# Patient Record
Sex: MALE | Race: WHITE
[De-identification: names, ages, dates, MRNs, and addresses within clinical notes are randomized per-mention and may not be internally consistent; named-entity substitution may affect disease eponyms.]

---

## 2017-07-17 ENCOUNTER — HOSPITAL ENCOUNTER (EMERGENCY)
Dept: HOSPITAL 83 - ED | Age: 27
Discharge: HOME | End: 2017-07-17
Payer: SELF-PAY

## 2017-07-17 DIAGNOSIS — S61.211A: Primary | ICD-10-CM

## 2017-07-17 DIAGNOSIS — F17.200: ICD-10-CM
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
## 2017-07-17 DIAGNOSIS — Z23: ICD-10-CM
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
## 2018-05-26 ENCOUNTER — HOSPITAL ENCOUNTER (EMERGENCY)
Dept: HOSPITAL 83 - ED | Age: 28
Discharge: HOME | End: 2018-05-26
Payer: SELF-PAY

## 2018-05-26 VITALS — BODY MASS INDEX: 36.1 KG/M2 | HEIGHT: 66.97 IN | WEIGHT: 230 LBS

## 2018-05-26 DIAGNOSIS — M67.431: Primary | ICD-10-CM

## 2018-05-26 DIAGNOSIS — Z98.890: ICD-10-CM

## 2018-05-26 DIAGNOSIS — R03.0: ICD-10-CM

## 2018-10-30 ENCOUNTER — HOSPITAL ENCOUNTER (EMERGENCY)
Dept: HOSPITAL 83 - ED | Age: 28
Discharge: HOME | End: 2018-10-30
Payer: COMMERCIAL

## 2018-10-30 VITALS — WEIGHT: 216 LBS | BODY MASS INDEX: 33.9 KG/M2 | HEIGHT: 66.97 IN

## 2018-10-30 DIAGNOSIS — J03.90: ICD-10-CM

## 2018-10-30 DIAGNOSIS — H65.01: ICD-10-CM

## 2018-10-30 DIAGNOSIS — J02.9: Primary | ICD-10-CM

## 2018-12-28 ENCOUNTER — HOSPITAL ENCOUNTER (EMERGENCY)
Dept: HOSPITAL 83 - ED | Age: 28
Discharge: HOME | End: 2018-12-28
Payer: COMMERCIAL

## 2018-12-28 VITALS — WEIGHT: 216 LBS | HEIGHT: 66.97 IN | BODY MASS INDEX: 33.9 KG/M2

## 2018-12-28 DIAGNOSIS — L72.0: Primary | ICD-10-CM

## 2019-04-06 ENCOUNTER — HOSPITAL ENCOUNTER (EMERGENCY)
Dept: HOSPITAL 83 - ED | Age: 29
Discharge: HOME | End: 2019-04-06
Payer: COMMERCIAL

## 2019-04-06 VITALS — WEIGHT: 220 LBS | BODY MASS INDEX: 34.53 KG/M2 | HEIGHT: 66.97 IN
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
## 2019-04-06 DIAGNOSIS — S63.502A: Primary | ICD-10-CM

## 2020-01-13 ENCOUNTER — HOSPITAL ENCOUNTER (EMERGENCY)
Dept: HOSPITAL 83 - ED | Age: 30
Discharge: HOME | End: 2020-01-13
Payer: SELF-PAY

## 2020-01-13 VITALS — HEIGHT: 66.97 IN | WEIGHT: 230 LBS | BODY MASS INDEX: 36.1 KG/M2

## 2020-01-13 DIAGNOSIS — J02.8: Primary | ICD-10-CM

## 2020-01-13 DIAGNOSIS — Z79.899: ICD-10-CM

## 2023-08-17 ENCOUNTER — HOSPITAL ENCOUNTER (EMERGENCY)
Dept: HOSPITAL 83 - ED | Age: 33
Discharge: HOME | End: 2023-08-17
Payer: MEDICAID

## 2023-08-17 VITALS — HEIGHT: 66.97 IN | WEIGHT: 270 LBS | BODY MASS INDEX: 42.38 KG/M2
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
## 2023-08-17 DIAGNOSIS — S60.312A: Primary | ICD-10-CM
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
## 2023-08-17 DIAGNOSIS — Z98.890: ICD-10-CM

## 2023-09-01 ENCOUNTER — HOSPITAL ENCOUNTER (EMERGENCY)
Dept: HOSPITAL 83 - ED | Age: 33
Discharge: HOME | End: 2023-09-01
Payer: MEDICAID

## 2023-09-01 VITALS — HEIGHT: 66.97 IN | BODY MASS INDEX: 41.59 KG/M2 | WEIGHT: 265 LBS

## 2023-09-01 DIAGNOSIS — Q27.30: Primary | ICD-10-CM

## 2023-09-01 DIAGNOSIS — Z98.890: ICD-10-CM

## 2023-09-28 ENCOUNTER — HOSPITAL ENCOUNTER (EMERGENCY)
Dept: HOSPITAL 83 - ED | Age: 33
Discharge: HOME | End: 2023-09-28
Payer: MEDICAID

## 2023-09-28 VITALS — BODY MASS INDEX: 41.59 KG/M2 | WEIGHT: 265 LBS | HEIGHT: 66.97 IN

## 2023-09-28 DIAGNOSIS — R58: ICD-10-CM

## 2023-09-28 DIAGNOSIS — Z98.890: ICD-10-CM

## 2023-09-28 DIAGNOSIS — R22.31: Primary | ICD-10-CM

## 2023-10-03 ENCOUNTER — ANESTHESIA EVENT (OUTPATIENT)
Dept: OPERATING ROOM | Age: 33
End: 2023-10-03
Payer: MEDICAID

## 2023-10-03 ASSESSMENT — LIFESTYLE VARIABLES: SMOKING_STATUS: 0

## 2023-10-03 NOTE — H&P
History and Physical    Patient's Name/Date of Birth: Guillermo Bush / 1990 (89 y.o.)    Date: October 2, 2023     Chief Complaint: right thumb bleeding mass    HPI:    This is a 33years of age, right-hand-dominant, white male, who has had a history of what he calls a blood blister 2+ months ago. He has poked it a couple different times at home and had fair amount of hemorrhage. He did have to hold pressure, and did end up going to the emergency room on at least 1 occasion. Emergency room physician told him that there was an artery on the end of his finger that was causing the brisk bleeding. He was asked to follow-up with a surgeon, preferably a hand surgeon. The patient did not have insurance, and ended up getting insurance and finding my name on the Internet. I did provide the initial evaluation on Jun, at our [facility] office on 10/02/2023. I have counseled the patient on different treatment options of the pyogenic granuloma either chemically with silver nitrate/potassium nitrate sticks, which in his case since this was such a large mass might take 2 to 3 weeks, for necrosis of the stalk of the mass. The patient really would like surgery done because of the growth test nature of the mass off the tip of the thumb. I am in agreement and we did have a cancellation so we could do it 2 days later. It is inconvenient from [facility] up to [facility], although he is compliant. We did obtain verbal/written informed consent, while the patient was in the office. All questions were answered in the office, and again answered in the preoperative holding room. History reviewed. No pertinent past medical history. Past Surgical History:   Procedure Laterality Date    MYRINGOTOMY W/ TUBES         Prior to Admission medications    Not on File       Patient has no known allergies. History reviewed. No pertinent family history.     Social History     Socioeconomic History    Marital status: Not on file     Spouse name: Not on file    Number of children: Not on file    Years of education: Not on file    Highest education level: Not on file   Occupational History    Not on file   Tobacco Use    Smoking status: Former     Types: Cigarettes    Smokeless tobacco: Former   Vaping Use    Vaping Use: Every day    Substances: Nicotine, Flavoring   Substance and Sexual Activity    Alcohol use: Yes     Comment: OCCASIONAL    Drug use: Not on file    Sexual activity: Not on file   Other Topics Concern    Not on file   Social History Narrative    Not on file     Social Determinants of Health     Financial Resource Strain: Not on file   Food Insecurity: Not on file   Transportation Needs: Not on file   Physical Activity: Not on file   Stress: Not on file   Social Connections: Not on file   Intimate Partner Violence: Not on file   Housing Stability: Not on file       Review of Systems:   CONSTITUTIONAL:  negative  EYES:  negative  HEENT:  negative  RESPIRATORY:  negative  CARDIOVASCULAR:  negative  GASTROINTESTINAL:  negative  GENITOURINARY:  negative  INTEGUMENT/BREAST:  negative  HEMATOLOGIC/LYMPHATIC:  negative  ALLERGIC/IMMUNOLOGIC:  negative  ENDOCRINE:  negative  MUSCULOSKELETAL:  negative  NEUROLOGICAL:  negative  BEHAVIOR/PSYCH:  negative    Physical Exam:  Vitals:    10/02/23 1513   Weight: 270 lb (122.5 kg)   Height: 5' 7\" (1.702 m)       CONSTITUTIONAL:  awake, alert, cooperative, no apparent distress, and appears stated age  EYES:  Lids and lashes normal, pupils equal, round and reactive to light, extra ocular muscles intact, sclera clear, conjunctiva normal  ENT:  Normocephalic, without obvious abnormality, atraumatic, sinuses nontender on palpation, external ears without lesions, oral pharynx with moist mucus membranes, tonsils without erythema or exudates, gums normal and good dentition.   NECK:  Supple, symmetrical, trachea midline, no adenopathy, thyroid symmetric, not

## 2023-10-04 ENCOUNTER — HOSPITAL ENCOUNTER (OUTPATIENT)
Age: 33
Setting detail: OUTPATIENT SURGERY
Discharge: HOME | End: 2023-10-04
Attending: SURGERY | Admitting: SURGERY
Payer: MEDICAID

## 2023-10-04 ENCOUNTER — ANESTHESIA (OUTPATIENT)
Dept: OPERATING ROOM | Age: 33
End: 2023-10-04
Payer: MEDICAID

## 2023-10-04 VITALS
BODY MASS INDEX: 42.25 KG/M2 | RESPIRATION RATE: 16 BRPM | HEIGHT: 67 IN | DIASTOLIC BLOOD PRESSURE: 64 MMHG | HEART RATE: 87 BPM | TEMPERATURE: 97.9 F | SYSTOLIC BLOOD PRESSURE: 117 MMHG | WEIGHT: 269.2 LBS | OXYGEN SATURATION: 96 %

## 2023-10-04 DIAGNOSIS — L98.0 PYOGENIC GRANULOMA: ICD-10-CM

## 2023-10-04 PROCEDURE — 2500000003 HC RX 250 WO HCPCS: Performed by: NURSE ANESTHETIST, CERTIFIED REGISTERED

## 2023-10-04 PROCEDURE — 2500000003 HC RX 250 WO HCPCS: Performed by: SURGERY

## 2023-10-04 PROCEDURE — 7100000011 HC PHASE II RECOVERY - ADDTL 15 MIN: Performed by: SURGERY

## 2023-10-04 PROCEDURE — 6360000002 HC RX W HCPCS: Performed by: NURSE ANESTHETIST, CERTIFIED REGISTERED

## 2023-10-04 PROCEDURE — 2580000003 HC RX 258: Performed by: ANESTHESIOLOGY

## 2023-10-04 PROCEDURE — 88305 TISSUE EXAM BY PATHOLOGIST: CPT

## 2023-10-04 PROCEDURE — 2709999900 HC NON-CHARGEABLE SUPPLY: Performed by: SURGERY

## 2023-10-04 PROCEDURE — 3600000013 HC SURGERY LEVEL 3 ADDTL 15MIN: Performed by: SURGERY

## 2023-10-04 PROCEDURE — 7100000010 HC PHASE II RECOVERY - FIRST 15 MIN: Performed by: SURGERY

## 2023-10-04 PROCEDURE — 6370000000 HC RX 637 (ALT 250 FOR IP): Performed by: NURSE ANESTHETIST, CERTIFIED REGISTERED

## 2023-10-04 PROCEDURE — 3600000003 HC SURGERY LEVEL 3 BASE: Performed by: SURGERY

## 2023-10-04 PROCEDURE — 3700000000 HC ANESTHESIA ATTENDED CARE: Performed by: SURGERY

## 2023-10-04 PROCEDURE — 3700000001 HC ADD 15 MINUTES (ANESTHESIA): Performed by: SURGERY

## 2023-10-04 RX ORDER — FENTANYL CITRATE 50 UG/ML
INJECTION, SOLUTION INTRAMUSCULAR; INTRAVENOUS PRN
Status: DISCONTINUED | OUTPATIENT
Start: 2023-10-04 | End: 2023-10-04 | Stop reason: SDUPTHER

## 2023-10-04 RX ORDER — PROPOFOL 10 MG/ML
INJECTION, EMULSION INTRAVENOUS CONTINUOUS PRN
Status: DISCONTINUED | OUTPATIENT
Start: 2023-10-04 | End: 2023-10-04 | Stop reason: SDUPTHER

## 2023-10-04 RX ORDER — LIDOCAINE HYDROCHLORIDE 20 MG/ML
INJECTION, SOLUTION INTRAVENOUS PRN
Status: DISCONTINUED | OUTPATIENT
Start: 2023-10-04 | End: 2023-10-04 | Stop reason: SDUPTHER

## 2023-10-04 RX ORDER — LABETALOL HYDROCHLORIDE 5 MG/ML
INJECTION, SOLUTION INTRAVENOUS PRN
Status: DISCONTINUED | OUTPATIENT
Start: 2023-10-04 | End: 2023-10-04 | Stop reason: SDUPTHER

## 2023-10-04 RX ORDER — MIDAZOLAM HYDROCHLORIDE 1 MG/ML
INJECTION INTRAMUSCULAR; INTRAVENOUS PRN
Status: DISCONTINUED | OUTPATIENT
Start: 2023-10-04 | End: 2023-10-04 | Stop reason: SDUPTHER

## 2023-10-04 RX ORDER — KETAMINE HCL IN NACL, ISO-OSM 100MG/10ML
SYRINGE (ML) INJECTION PRN
Status: DISCONTINUED | OUTPATIENT
Start: 2023-10-04 | End: 2023-10-04 | Stop reason: SDUPTHER

## 2023-10-04 RX ORDER — HYDROCODONE BITARTRATE AND ACETAMINOPHEN 5; 325 MG/1; MG/1
1 TABLET ORAL EVERY 4 HOURS PRN
Qty: 4 TABLET | Refills: 0 | Status: SHIPPED | OUTPATIENT
Start: 2023-10-04 | End: 2023-10-06

## 2023-10-04 RX ORDER — ALBUTEROL SULFATE 90 UG/1
AEROSOL, METERED RESPIRATORY (INHALATION) PRN
Status: DISCONTINUED | OUTPATIENT
Start: 2023-10-04 | End: 2023-10-04 | Stop reason: SDUPTHER

## 2023-10-04 RX ORDER — SODIUM CHLORIDE, SODIUM LACTATE, POTASSIUM CHLORIDE, CALCIUM CHLORIDE 600; 310; 30; 20 MG/100ML; MG/100ML; MG/100ML; MG/100ML
INJECTION, SOLUTION INTRAVENOUS CONTINUOUS
Status: DISCONTINUED | OUTPATIENT
Start: 2023-10-04 | End: 2023-10-04 | Stop reason: HOSPADM

## 2023-10-04 RX ADMIN — LIDOCAINE HYDROCHLORIDE 50 MG: 20 INJECTION, SOLUTION INTRAVENOUS at 07:50

## 2023-10-04 RX ADMIN — FENTANYL CITRATE 50 MCG: 50 INJECTION INTRAMUSCULAR; INTRAVENOUS at 08:10

## 2023-10-04 RX ADMIN — MIDAZOLAM 2 MG: 1 INJECTION INTRAMUSCULAR; INTRAVENOUS at 07:48

## 2023-10-04 RX ADMIN — Medication 30 MG: at 07:50

## 2023-10-04 RX ADMIN — LABETALOL HYDROCHLORIDE 10 MG: 5 INJECTION INTRAVENOUS at 07:55

## 2023-10-04 RX ADMIN — SODIUM CHLORIDE, POTASSIUM CHLORIDE, SODIUM LACTATE AND CALCIUM CHLORIDE: 600; 310; 30; 20 INJECTION, SOLUTION INTRAVENOUS at 07:22

## 2023-10-04 RX ADMIN — PROPOFOL 100 MCG/KG/MIN: 10 INJECTION, EMULSION INTRAVENOUS at 07:50

## 2023-10-04 RX ADMIN — FENTANYL CITRATE 50 MCG: 50 INJECTION INTRAMUSCULAR; INTRAVENOUS at 07:50

## 2023-10-04 RX ADMIN — LABETALOL HYDROCHLORIDE 10 MG: 5 INJECTION INTRAVENOUS at 08:06

## 2023-10-04 RX ADMIN — ALBUTEROL SULFATE 4 PUFF: 90 AEROSOL, METERED RESPIRATORY (INHALATION) at 08:05

## 2023-10-04 ASSESSMENT — PAIN DESCRIPTION - DESCRIPTORS: DESCRIPTORS: SHARP

## 2023-10-04 ASSESSMENT — PAIN SCALES - GENERAL
PAINLEVEL_OUTOF10: 0

## 2023-10-04 ASSESSMENT — PAIN - FUNCTIONAL ASSESSMENT: PAIN_FUNCTIONAL_ASSESSMENT: 0-10

## 2023-10-04 NOTE — ANESTHESIA POSTPROCEDURE EVALUATION
Department of Anesthesiology  Postprocedure Note    Patient: Barney River  MRN: [id number]  YOB: 1990  Date of evaluation: 10/4/2023      Procedure Summary     Date: 10/04/23 Room / Location: [address] [place] [address] 01 / [id number] Julianne Laura    Anesthesia Start: 2420 Anesthesia Stop:     Procedure: RIGHT THUMB P2 MASS EXCISION (Right) Diagnosis:       Pyogenic granuloma      (Pyogenic granuloma [L98.0])    Surgeons: Austin Boudreaux MD Responsible Provider: Merlin Nutting, MD    Anesthesia Type: MAC ASA Status: 2          Anesthesia Type: No value filed.     Kathy Phase I: Kathy Score: 10    Kathy Phase II:        Anesthesia Post Evaluation

## 2023-10-04 NOTE — OP NOTE
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]    Post-Op Diagnosis: Same       Procedure(s):  RIGHT THUMB- P2 MASS EXCISION    Surgeon(s):  Peña William M.D.; Hand + Reconstructive Surgery    Assistant:   * No surgical staff found *    Anesthesia: Monitor Anesthesia Care + Local    Estimated Blood Loss (mL): Minimal 3 ml    Complications: None    Specimens:   ID Type Source Tests Collected by Time Destination   A : right thumb P2 mass  Tissue Tissue SURGICAL PATHOLOGY Peña William MD 10/4/2023 0809        Implants:  * No implants in log *      Drains: * No LDAs found *    This procedure was not performed to treat primary cutaneous melanoma through wide local excision    OPERATIVE NOTE : Right thumb mass excision + excisional biopsy    Indications: This is a 33years of age, right-hand-dominant, white male, has had a 2+ month history of progressive growth of a highly friable, hemorrhagic mass. By history, and physical examination, this is highly consistent with a pyogenic granuloma. I have counseled the patient on different treatment options, including non-surgical ones. Since the mass was 3 x 1.5 x 1.5 cm, and fairly grotesque to the patient and family members, a he is in high favor of urgent placement on outpatient surgery schedule. I have counseled the patient on both medical and surgical approaches. I have instructed him over the next couple of days to use silver nitrate sticks to help with desiccation in combination with Betadine solution on top of the mass. We did have a cancellation, instead of the 3-week lag time, we were able to place the patient 2 days later on the outpatient surgery schedule at [facility]. We did obtain verbal/written informed consent, while the patient was in the office.   All questions were answered at

## 2023-10-10 LAB — SURGICAL PATHOLOGY REPORT: NORMAL

## (undated) DEVICE — BLADE OPHTH GRN ROUNDED TIP 1 SIDE SHRP GRINDLESS MINI-BLDE

## (undated) DEVICE — TIBURON EXTREMITY SHEET: Brand: CONVERTORS

## (undated) DEVICE — CATHETER IV 20GA L1.16IN OD1.080MM ID0.800MM 60ML/MIN PNK

## (undated) DEVICE — CUFF TOURNIQUET 18 SNG BLADDER DUAL PORT

## (undated) DEVICE — GOWN SURG XL SMS FAB NONREINFORCED RAGLAN SLV HK LOOP CLSR

## (undated) DEVICE — BASIC PACK: Brand: CONVERTORS

## (undated) DEVICE — INTENDED FOR TISSUE SEPARATION, AND OTHER PROCEDURES THAT REQUIRE A SHARP SURGICAL BLADE TO PUNCTURE OR CUT.: Brand: BARD-PARKER ® STAINLESS STEEL BLADES

## (undated) DEVICE — GLOVE ORANGE PI 8 1/2   MSG9085

## (undated) DEVICE — SOLUTION SCRB 32OZ 7.5% POVIDONE IOD BTL GENTLE EFFECTIVE

## (undated) DEVICE — NEEDLE HYPO 18GA L1.5IN PNK POLYPR HUB S STL THN WALL FILL

## (undated) DEVICE — HANDLE CVR PATENTED RETENTION DISC STRL LIGHT SHLD

## (undated) DEVICE — BANDAGE COMPR W4INXL5YD WHT BGE POLY COT M E WRP WV HK AND

## (undated) DEVICE — 20 ML SYRINGE REGULAR TIP: Brand: MONOJECT

## (undated) DEVICE — E-Z CLEAN, NON-STICK, PTFE COATED, ELECTROSURGICAL BLADE ELECTRODE, 2.5 INCH (6.35 CM): Brand: EZ CLEAN

## (undated) DEVICE — PEN: MARKING STD 100/CS: Brand: MEDICAL ACTION INDUSTRIES

## (undated) DEVICE — SOLUTION IV IRRIG POUR BRL 0.9% SODIUM CHL 2F7124

## (undated) DEVICE — Z DUP USE 2272898 STOCKINETTE COMPR W4XL54IN 2 PLY COT

## (undated) DEVICE — 12 ML SYRINGE,LUER-LOCK TIP: Brand: MONOJECT

## (undated) DEVICE — 1810 FOAM BLOCK NEEDLE COUNTER: Brand: DEVON

## (undated) DEVICE — MARKER,SKIN,WI/RULER AND LABELS: Brand: MEDLINE

## (undated) DEVICE — BANDAGE,GAUZE,BULKEE II,4.5"X4.1YD,STRL: Brand: MEDLINE

## (undated) DEVICE — CONTROL SYRINGE LUER-LOCK TIP: Brand: MONOJECT

## (undated) DEVICE — GLOVE SURG SZ 8 L12IN FNGR THK94MIL STD WHT LTX FREE

## (undated) DEVICE — PAD N ADH W3XL4IN POLY COT SFT PERF FLM EASILY CUT ABSRB

## (undated) DEVICE — NEEDLE HYPO 25GA L1.5IN BLU POLYPR HUB S STL REG BVL STR

## (undated) DEVICE — TOWEL OR BLUEE 16X26IN ST 8 PACK ORB08 16X26ORTWL

## (undated) DEVICE — Z INACTIVE USE 2863041 SPONGE GZ W4XL4IN 100% COT 16 PLY RADPQ HIGHLY ABSRB

## (undated) DEVICE — TOURNIQUET PHLEB L EXSANGUINATING FOR AD DGT TOURNI-COT

## (undated) DEVICE — NEPTUNE E-SEP SMOKE EVACUATION PENCIL, COATED, 70MM BLADE, PUSH BUTTON SWITCH: Brand: NEPTUNE E-SEP

## (undated) DEVICE — ELECTRODE PT RET AD L9FT HI MOIST COND ADH HYDRGEL CORDED